# Patient Record
Sex: FEMALE | Race: WHITE | Employment: FULL TIME | URBAN - METROPOLITAN AREA
[De-identification: names, ages, dates, MRNs, and addresses within clinical notes are randomized per-mention and may not be internally consistent; named-entity substitution may affect disease eponyms.]

---

## 2017-01-09 ENCOUNTER — TELEPHONE (OUTPATIENT)
Dept: CARDIOLOGY CLINIC | Age: 27
End: 2017-01-09

## 2017-01-09 NOTE — TELEPHONE ENCOUNTER
Contacted patient to confirm New Patient appointment for 1/9/17 but patient stated that she was able to be seen by her previous cardiologist already and no longer needed the appointment.

## 2017-06-14 ENCOUNTER — OFFICE VISIT (OUTPATIENT)
Dept: FAMILY MEDICINE CLINIC | Age: 27
End: 2017-06-14

## 2017-06-14 VITALS
OXYGEN SATURATION: 99 % | SYSTOLIC BLOOD PRESSURE: 109 MMHG | HEART RATE: 75 BPM | HEIGHT: 63 IN | WEIGHT: 124.6 LBS | DIASTOLIC BLOOD PRESSURE: 71 MMHG | TEMPERATURE: 98.2 F | RESPIRATION RATE: 18 BRPM | BODY MASS INDEX: 22.08 KG/M2

## 2017-06-14 DIAGNOSIS — E03.9 ACQUIRED HYPOTHYROIDISM: Primary | ICD-10-CM

## 2017-06-14 NOTE — PROGRESS NOTES
Kriss Cheek is a 28 y.o. female presents to office for thyroid. Pt requesitng referral to new endocrinologist.       1. Have you been to the ER, urgent care clinic or hospitalized since your last visit? no  2. Have you seen any other providers outside of [facility] since your last visit? yes  3.  Have you had a Flu shot this year? no       Health Maintenance items with a due date reviewed with patient:  Health Maintenance Due   Topic Date Due    DTaP/Tdap/Td series (1 - Tdap) 05/24/2010    PAP AKA CERVICAL CYTOLOGY  05/24/2010

## 2017-06-14 NOTE — PROGRESS NOTES
Kusum Anne is a 28 y.o.  female and presents with need for a new referral to new endo and labs for hypothyroidism   With hx of Graves disease. Chief Complaint   Patient presents with    Thyroid Problem     Subjective:    Chief Complaint   Patient presents with    Thyroid Problem     Subjective: Additional Concerns: none    Patient Active Problem List    Diagnosis Date Noted    Physical exam 07/05/2016    RUQ abdominal pain 07/05/2016    Lipoma of right thigh 07/05/2016    Anxiety     Grave's disease      Current Outpatient Prescriptions   Medication Sig Dispense Refill    levothyroxine (SYNTHROID) 88 mcg tablet Take 100 mcg by mouth Daily (before breakfast).  norethindrone-ethinyl estradiol (NECON 1/35, 28,) 1-35 mg-mcg per tablet Take  by mouth.  norethindrone-ethinyl estradiol-iron (LOESTRIN FE 1.5/30, 28,) 1.5-30 mg-mcg tablet Take 1 Tab by mouth daily.          Allergies   Allergen Reactions    Latex Other (comments)    Sulfa (Sulfonamide Antibiotics) Hives    Sulfa (Sulfonamide Antibiotics) Other (comments)     Past Medical History:   Diagnosis Date    Anxiety     Constipation     Grave's disease     S/P radioactive ablation    Graves disease     Heart palpitations     Hypothyroid     Thyroid dysfunction     UTI (urinary tract infection)      Past Surgical History:   Procedure Laterality Date    HX OTHER SURGICAL      lymp node removal     HX TONSILLECTOMY       Family History   Problem Relation Age of Onset    Coronary Artery Disease Father     Cancer Father      thyroid     Social History   Substance Use Topics    Smoking status: Never Smoker    Smokeless tobacco: Not on file    Alcohol use No     ROS     General: negative for - chills, fatigue, fever, weight change  Psych: negative for - anxiety, depression, irritability or mood swings  ENT: negative for - headaches, hearing change, nasal congestion, oral lesions, sneezing or sore throat  Heme/ Lymph: negative for - bleeding problems, bruising, pallor or swollen lymph nodes  Endo: negative for - hot flashes, polydipsia/polyuria or temperature intolerance  Resp: negative for - cough, shortness of breath or wheezing  CV: negative for - chest pain, edema or palpitations  GI: negative for - abdominal pain, change in bowel habits, constipation, diarrhea or nausea/vomiting  : negative for - dysuria, hematuria, incontinence, pelvic pain or vulvar/vaginal symptoms  MSK: negative for - joint pain, joint swelling or muscle pain  Neuro: negative for - confusion, headaches, seizures or weakness  Derm: negative for - dry skin, hair changes, rash or skin lesion changes    Objective:  Vitals:    06/14/17 1423   BP: 109/71   Pulse: 75   Resp: 18   Temp: 98.2 °F (36.8 °C)   TempSrc: Oral   SpO2: 99%   Weight: 124 lb 9.6 oz (56.5 kg)   Height: 5' 3\" (1.6 m)   PainSc:   0 - No pain   LMP: 06/01/2017     PE    Alert, well appearing, and in no distress, oriented to person, place, and time and normal appearing weight  Mental status - alert, oriented to person, place, and time, normal mood, behavior, speech, dress, motor activity, and thought processes  Neck - supple, no significant adenopathy  Chest - clear to auscultation, no wheezes, rales or rhonchi, symmetric air entry  Heart - normal rate, regular rhythm, normal S1, S2, no murmurs, rubs, clicks or gallops  Extremities - peripheral pulses normal, no pedal edema, no clubbing or cyanosis    LABS   Office Visit on 12/16/2016   Component Date Value Ref Range Status    TSH 12/16/2016 1.48  0.27 - 4.20 mcU/mL Final       TESTS  Results for orders placed or performed in visit on 12/16/16   TSH 3RD GENERATION   Result Value Ref Range    TSH 1.48 0.27 - 4.20 mcU/mL     Assessment/Plan:       Acquired hypothyroidism, hx of Grave's disease.   - TSH 3RD GENERATION; Future  - REFERRAL TO ENDOCRINOLOGY  - T4, FREE; Future  - T3, FREE;  Future    Lab review: no lab studies available for review at time of visit. We will call for results and further plan. I have discussed the diagnosis with the patient and the intended plan as seen in the above orders. The patient has received an after-visit summary and questions were answered concerning future plans. I have discussed medication side effects and warnings with the patient as well. I have reviewed the plan of care with the patient, accepted their input and they are in agreement with the treatment goals. F/U needed. Og Ronquillo MD    Additional Concerns: none    Patient Active Problem List    Diagnosis Date Noted    Physical exam 07/05/2016    RUQ abdominal pain 07/05/2016    Lipoma of right thigh 07/05/2016    Anxiety     Grave's disease      Current Outpatient Prescriptions   Medication Sig Dispense Refill    norethindrone-ethinyl estradiol (Joseph Slimmer 1/35, 28,) 1-35 mg-mcg per tablet Take  by mouth.  levothyroxine (SYNTHROID) 88 mcg tablet Take 100 mcg by mouth Daily (before breakfast).  norethindrone-ethinyl estradiol-iron (LOESTRIN FE 1.5/30, 28,) 1.5-30 mg-mcg tablet Take 1 Tab by mouth daily.          Allergies   Allergen Reactions    Latex Other (comments)    Sulfa (Sulfonamide Antibiotics) Hives    Sulfa (Sulfonamide Antibiotics) Other (comments)     Past Medical History:   Diagnosis Date    Anxiety     Constipation     Grave's disease     S/P radioactive ablation    Graves disease     Heart palpitations     Hypothyroid     Thyroid dysfunction     UTI (urinary tract infection)      Past Surgical History:   Procedure Laterality Date    HX OTHER SURGICAL      lymp node removal     HX TONSILLECTOMY       Family History   Problem Relation Age of Onset    Coronary Artery Disease Father     Cancer Father      thyroid     Social History   Substance Use Topics    Smoking status: Never Smoker    Smokeless tobacco: Not on file    Alcohol use No     ROS     General: negative for - chills,  fever, weight change  Psych: negative for - anxiety, depression, irritability or mood swings  Heme/ Lymph: negative for - bleeding problems, bruising, pallor or swollen lymph nodes  Endo: negative for - hot flashes, polydipsia/polyuria or temperature intolerance  Resp: negative for - cough, shortness of breath or wheezing  CV: negative for - chest pain, edema or palpitations  GI: negative for - abdominal pain, change in bowel habits, constipation, diarrhea or nausea/vomiting  : negative for - dysuria, hematuria, incontinence, pelvic pain or vulvar/vaginal symptoms  MSK: negative for - joint pain, joint swelling or muscle pain  Neuro: negative for - confusion, headaches, seizures or weakness  Derm: negative for - dry skin, hair changes, rash or skin lesion changes    Objective:    PE    Alert, well appearing, and in no distress, oriented to person, place, and time and overweight  Mental status - alert, oriented to person, place, and time, normal mood, behavior, speech, dress, motor activity, and thought processes  Neck - supple, no significant adenopathy  Chest - clear to auscultation, no wheezes, rales or rhonchi, symmetric air entry  Heart - normal rate, regular rhythm, normal S1, S2, no murmurs, rubs, clicks or gallops  Extremities - peripheral pulses normal, no pedal edema, no clubbing or cyanosis    LABS   Office Visit on 12/16/2016   Component Date Value Ref Range Status    TSH 12/16/2016 1.48  0.27 - 4.20 mcU/mL Final       TESTS  Results for orders placed or performed in visit on 12/16/16   TSH 3RD GENERATION   Result Value Ref Range    TSH 1.48 0.27 - 4.20 mcU/mL     Assessment/Plan:      Acquired hypothyroidism   - TSH 3RD GENERATION; Future    Lab review: orders written for new lab studies as appropriate; see orders. I have discussed the diagnosis with the patient and the intended plan as seen in the above orders.   The patient has received an after-visit summary and questions were answered concerning future plans.  I have discussed medication side effects and warnings with the patient as well. I have reviewed the plan of care with the patient, accepted their input and they are in agreement with the treatment goals. F/U as needed.      Jose Quintana MD

## 2017-06-14 NOTE — PATIENT INSTRUCTIONS
Hypothyroidism: Care Instructions  Your Care Instructions  You have hypothyroidism, which means that your body is not making enough thyroid hormone. This hormone helps your body use energy. If your thyroid level is low, you may feel tired, be constipated, have an increase in your blood pressure, or have dry skin or memory problems. You may also get cold easily, even when it is warm. Women with low thyroid levels may have heavy menstrual periods. A blood test to find your thyroid-stimulating hormone (TSH) level is used to check for hypothyroidism. A high TSH level may mean that you have low thyroid. When your body is not making enough thyroid hormone, TSH levels rise in an effort to make the body produce more. The treatment for hypothyroidism is to take thyroid hormone pills. You should start to feel better in 1 to 2 weeks. But it can take several months to see changes in the TSH level. You will need regular visits with your doctor to make sure you have the right dose of medicine. Most people need treatment for the rest of their lives. You will need to see your doctor regularly to have blood tests and to make sure you are doing well. Follow-up care is a key part of your treatment and safety. Be sure to make and go to all appointments, and call your doctor if you are having problems. Its also a good idea to know your test results and keep a list of the medicines you take. How can you care for yourself at home? · Take your thyroid hormone medicine exactly as prescribed. Call your doctor if you think you are having a problem with your medicine. Most people do not have side effects if they take the right amount of medicine regularly. ¨ Take the medicine 30 minutes before breakfast, and do not take it with calcium, vitamins, or iron. ¨ Do not take extra doses of your thyroid medicine. It will not help you get better any faster, and it may cause side effects.   ¨ If you forget to take a dose, do NOT take a double dose of medicine. Take your usual dose the next day. · Tell your doctor about all prescription, herbal, or over-the-counter products you take. · Take care of yourself. Eat a healthy diet, get enough sleep, and get regular exercise. When should you call for help? Call 911 anytime you think you may need emergency care. For example, call if:  · You passed out (lost consciousness). · You have severe trouble breathing. · You have a very slow heartbeat (less than 60 beats a minute). · You have a low body temperature (95°F or below). Call your doctor now or seek immediate medical care if:  · You feel tired, sluggish, or weak. · You have trouble remembering things or concentrating. · You do not begin to feel better 2 weeks after starting your medicine. Watch closely for changes in your health, and be sure to contact your doctor if you have any problems. Where can you learn more? Go to http://jaime-arun.info/. Enter P615 in the search box to learn more about \"Hypothyroidism: Care Instructions. \"  Current as of: July 28, 2016  Content Version: 11.2  © 5333-2891 [employer]. Care instructions adapted under license by [employer] (which disclaims liability or warranty for this information). If you have questions about a medical condition or this instruction, always ask your healthcare professional. [address] any warranty or liability for your use of this information.

## 2017-06-14 NOTE — LETTER
6/19/2017 12:46 PM 
 
Ms. Maria Elena Martini 
[address] [facility] [address] [address] Dear Isabel Going: 
 
Please find your most recent results below. Resulted Orders TSH 3RD GENERATION Result Value Ref Range TSH 1.60 0.27 - 4.20 mcU/mL Narrative Unless additionally indicated, test performed at: [facility],  [facility], [facility]. PH: [phone]. T4, FREE Result Value Ref Range T4, Free 1.5 0.9 - 1.8 ng/dL Narrative Unless additionally indicated, test performed at: [facility],  [address] [facility] [address], [facility]. PH: [phone]. T3, FREE Result Value Ref Range Triiodothyronine (T3), free 2.8 2.3 - 4.2 pg/mL Narrative Unless additionally indicated, test performed at: [facility],  [address], [facility]. PH: [phone]. RECOMMENDATIONS: 
Normal results, follow-up as needed. Please call me if you have any questions: [phone] Sincerely, Michael Resendez MD

## 2017-06-15 LAB
T3FREE SERPL-MCNC: 2.8 PG/ML (ref 2.3–4.2)
T4 FREE SERPL-MCNC: 1.5 NG/DL (ref 0.9–1.8)
TSH SERPL DL<=0.005 MIU/L-ACNC: 1.6 MCU/ML (ref 0.27–4.2)

## 2017-07-28 ENCOUNTER — OFFICE VISIT (OUTPATIENT)
Dept: FAMILY MEDICINE CLINIC | Age: 27
End: 2017-07-28

## 2017-07-28 VITALS
HEIGHT: 63 IN | HEART RATE: 82 BPM | DIASTOLIC BLOOD PRESSURE: 76 MMHG | RESPIRATION RATE: 16 BRPM | SYSTOLIC BLOOD PRESSURE: 98 MMHG | OXYGEN SATURATION: 98 % | TEMPERATURE: 96.8 F | WEIGHT: 120 LBS | BODY MASS INDEX: 21.26 KG/M2

## 2017-07-28 DIAGNOSIS — Z00.00 ROUTINE GENERAL MEDICAL EXAMINATION AT A HEALTH CARE FACILITY: Primary | ICD-10-CM

## 2017-07-28 PROBLEM — I45.81 LONG Q-T SYNDROME: Status: ACTIVE | Noted: 2017-07-28

## 2017-07-28 RX ORDER — LEVOTHYROXINE SODIUM 100 UG/1
CAPSULE ORAL
Refills: 0 | COMMUNITY
Start: 2017-07-07

## 2017-07-28 NOTE — PROGRESS NOTES
Sherice Zavala is a 28 y.o. female presents in office to physical and cough. Health Maintenance Due   Topic Date Due    DTaP/Tdap/Td series (1 - Tdap) 05/24/2010    PAP AKA CERVICAL CYTOLOGY  05/24/2010         1. Have you been to the ER, urgent care clinic since your last visit? Hospitalized since your last visit?no    2. Have you seen or consulted any other health care providers outside of the [address] [place] [address] since your last visit? Include any pap smears or colon screening.  no

## 2017-07-28 NOTE — PROGRESS NOTES
Subjective:   Patient is a 28y.o. year old female who presents for Physical  Here for CPE. Fasting today. She has cough for 2 weeks. A little productive. Some post-nasal drip as well. No sinus pressure. No h/o allergic rhinitis. Her daughter had a cold first.      Review of Systems   Constitutional: Negative. HENT: Positive for congestion. Negative for ear pain and sore throat. Eyes: Negative. Respiratory: Positive for cough. Negative for shortness of breath. Cardiovascular: Negative. Gastrointestinal: Positive for constipation. Genitourinary: Negative. Musculoskeletal: Negative. Skin: Negative. Neurological: Negative. Psychiatric/Behavioral: Negative. Current Outpatient Prescriptions on File Prior to Visit   Medication Sig Dispense Refill    norethindrone-ethinyl estradiol (Meng Clack 1/35, 28,) 1-35 mg-mcg per tablet Take  by mouth.  levothyroxine (SYNTHROID) 88 mcg tablet Take 100 mcg by mouth Daily (before breakfast).  norethindrone-ethinyl estradiol-iron (LOESTRIN FE 1.5/30, 28,) 1.5-30 mg-mcg tablet Take 1 Tab by mouth daily. No current facility-administered medications on file prior to visit. Reviewed PmHx, RxHx, FmHx, SocHx, AllgHx and updated and dated in the chart. Nurse notes were reviewed and are correct    Objective:     Vitals:    07/28/17 1039   BP: 98/76   Pulse: 82   Resp: 16   Temp: 96.8 °F (36 °C)   TempSrc: Oral   SpO2: 98%   Weight: 120 lb (54.4 kg)   Height: 5' 2.99\" (1.6 m)     Physical Exam   Constitutional: She appears well-developed and well-nourished. No distress. HENT:   Head: Normocephalic and atraumatic. Right Ear: External ear normal.   Left Ear: External ear normal.   Mouth/Throat: Oropharynx is clear and moist.   Nose congested   Eyes: Conjunctivae are normal. Pupils are equal, round, and reactive to light. No scleral icterus. Neck: Normal range of motion. Neck supple. No tracheal deviation present.  No thyromegaly present. Cardiovascular: Normal rate, regular rhythm, normal heart sounds and intact distal pulses. Exam reveals no gallop and no friction rub. No murmur heard. Pulmonary/Chest: Effort normal and breath sounds normal. She has no wheezes. She has no rales. Abdominal: Soft. Bowel sounds are normal. She exhibits no distension. There is no hepatosplenomegaly. There is no tenderness. Musculoskeletal: Normal range of motion. She exhibits no edema or tenderness. Lymphadenopathy:     She has no cervical adenopathy. Skin: Skin is warm and dry. No rash noted. No pallor. Psychiatric: She has a normal mood and affect. Judgment normal.   Nursing note and vitals reviewed. Assessment/ Plan:     Diagnoses and all orders for this visit:    1. Routine general medical examination at a health care facility  -     CBC WITH AUTOMATED DIFF; Future  -     METABOLIC PANEL, COMPREHENSIVE; Future  -     LIPID PANEL; Future  Not getting TSH because she just had TFTs 1 month ago. Her symptoms c/w URI. I told her to give more time and should resolve. If worsens, RTC. I have discussed the diagnosis with the patient and the intended plan as seen in the above orders. The patient verbalized understanding and agrees with the plan. Follow-up Disposition:  Return if symptoms worsen or fail to improve.     Meseret Mullins MD

## 2017-07-28 NOTE — PATIENT INSTRUCTIONS

## 2017-08-09 ENCOUNTER — TELEPHONE (OUTPATIENT)
Dept: FAMILY MEDICINE CLINIC | Age: 27
End: 2017-08-09

## 2018-06-07 ENCOUNTER — OFFICE VISIT (OUTPATIENT)
Dept: FAMILY MEDICINE CLINIC | Age: 28
End: 2018-06-07

## 2018-06-07 VITALS
HEART RATE: 76 BPM | OXYGEN SATURATION: 100 % | HEIGHT: 62 IN | TEMPERATURE: 97.4 F | BODY MASS INDEX: 22.34 KG/M2 | DIASTOLIC BLOOD PRESSURE: 72 MMHG | SYSTOLIC BLOOD PRESSURE: 116 MMHG | RESPIRATION RATE: 17 BRPM | WEIGHT: 121.4 LBS

## 2018-06-07 DIAGNOSIS — L98.9 SKIN LESIONS: Primary | ICD-10-CM

## 2018-06-07 RX ORDER — PROPRANOLOL HYDROCHLORIDE 20 MG/1
TABLET ORAL 2 TIMES DAILY
COMMUNITY

## 2018-06-07 NOTE — PATIENT INSTRUCTIONS
Skin Lesions: Care Instructions  Your Care Instructions  A skin lesion is a general term used for the different types of bumps, spots, moles or other growths that may appear on your skin. Most skin lesions are harmless, but sometimes they can be a sign of skin cancer or other health problems. Depending on what type of lesion you have, your doctor may cut out all or a small area of the skin tissue and send it to a lab to be looked at under a microscope. This is called a biopsy. A biopsy may be done to figure out what the lesion is or to make sure it is not skin cancer. Follow-up care is a key part of your treatment and safety. Be sure to make and go to all appointments, and call your doctor if you are having problems. It's also a good idea to know your test results and keep a list of the medicines you take. How can you care for yourself at home? · If your doctor told you how to care for your wound, follow your doctor's instructions. If you did not get instructions, follow this general advice:  ¨ Keep the wound bandaged and dry for the first day. ¨ After the first day, wash around the wound with clean water 2 times a day. Don't use hydrogen peroxide or alcohol, which can slow healing. ¨ You may cover the wound with a thin layer of petroleum jelly, such as Vaseline, and a nonstick bandage. ¨ Apply more petroleum jelly and replace the bandage as needed. · If you have stitches, you may get other instructions. You will have to return to have the stitches removed. · If a scab forms, do not pull it off. Let it fall off on its own. Wounds heal faster if no scab forms. Washing the area every day and using petroleum jelly will help keep a scab from forming. · If the wound bleeds, put direct pressure on it with a clean cloth until the bleeding stops. · Take an over-the-counter pain medicine, such as acetaminophen (Tylenol), ibuprofen (Advil, Motrin), or naproxen (Aleve).  Read and follow all instructions on the label.  · Do not take two or more pain medicines at the same time unless the doctor told you to. Many pain medicines have acetaminophen, which is Tylenol. Too much acetaminophen (Tylenol) can be harmful. · If you had a growth \"frozen\" off with liquid nitrogen, you may get a blister. Do not break it. Let it dry up on its own. It is common for the blister to fill with blood. You do not need to do anything about this, but if it becomes too painful, call your doctor. When should you call for help? Call your doctor now or seek immediate medical care if:  ? · You have signs of infection, such as:  ¨ Increased pain, swelling, warmth, or redness. ¨ Red streaks leading from the wound. ¨ Pus draining from the wound. ¨ A fever. ? Watch closely for changes in your health, and be sure to contact your doctor if:  ? · The wound changes, bleeds, or gets worse. ? · You do not get better after 2 weeks of home care. Where can you learn more? Go to http://jaime-arun.info/. Enter T449 in the search box to learn more about \"Skin Lesions: Care Instructions. \"  Current as of: October 13, 2016  Content Version: 11.4  © 1687-5742 [employer]. Care instructions adapted under license by [employer] (which disclaims liability or warranty for this information). If you have questions about a medical condition or this instruction, always ask your healthcare professional. [place] [address] any warranty or liability for your use of this information.

## 2018-06-07 NOTE — PROGRESS NOTES
Freddy Boone is a 33 y.o. female presents to office for bumps on legs    Medication list has been reviewed with Freddy Boone and updated as of today's date     Health Maintenance items with a due date reviewed with patient:  Health Maintenance Due   Topic Date Due    DTaP/Tdap/Td series (1 - Tdap) 05/24/2010    PAP AKA CERVICAL CYTOLOGY  05/24/2010

## 2018-06-07 NOTE — MR AVS SNAPSHOT
[address] [facility] 
 
 
 [address] [facility] [address] [facility]ly [facility] [address] 
[phone] Patient: Vikki Núñez MRN: [id number] Visit Information Date & Time Provider Department Dept. Phone Encounter #  
 6/7/2018  3:00 PM Pablito Mcknight MD [facility] [phone] [id number] Follow-up Instructions Return if symptoms worsen or fail to improve. Your Appointments 7/31/2018  9:00 AM  
PHYSICAL with Pablito Mcknight MD  
[facility] App[facility] Note: cpe  
 [address] [facility] [address] [facility] [address]  
[phone]  
  
   
 [address] [place] [id number] Upcoming Health Maintenance Date Due DTaP/Tdap/Td series (1 - Tdap) 5/24/2010 PAP AKA CERVICAL CYTOLOGY 5/24/2010 Influenza Age 5 to Adult 8/1/2018 Allergies as of 6/7/2018  Review Complete On: 7/28/2017 By: Amado Mccarthy MD  
  
 Severity Noted Reaction Type Reactions Latex  12/12/2014    Other (comments) Sulfa (Sulfonamide Antibiotics)  04/26/2012    Hives Sulfa (Sulfonamide Antibiotics)  12/12/2014    Other (comments) Current Immunizations  Never Reviewed No immunizations on file. Not reviewed this visit You Were Diagnosed With   
  
 Codes Comments Skin lesions    -  Primary ICD-10-CM: L98.9 ICD-9-CM: 709.9 Vitals BP Pulse Temp Resp Height(growth percentile) Weight(growth percentile) 116/72 76 97.4 °F (36.3 °C) (Oral) 17 5' 2\" (1.575 m) 121 lb 6.4 oz (55.1 kg) LMP SpO2 BMI OB Status Smoking Status 06/01/2018 100% 22.2 kg/m2 IUD Never Smoker Vitals History BMI and BSA Data Body Mass Index Body Surface Area  
 22.2 kg/m 2 1.55 m 2 Preferred Pharmacy Pharmacy Name Phone  [id number] [address] [place] [address] [place] [phone] Your Updated Medication List  
  
   
This list is accurate as of 6/7/18  3:14 PM.  Always use your most recent med list.  
  
  
  
  
 Sukh Cesarena FE 1.5/30 (28-DAY) 1.5 mg-30 mcg (21)/75 mg (7) Tab Generic drug:  norethindrone-ethinyl estradiol-iron Take 1 Tab by mouth daily. NECON 1/35 (28) 1-35 mg-mcg Tab Generic drug:  norethindrone-ethinyl estradiol Take  by mouth.  
  
 propranolol 20 mg tablet Commonly known as:  INDERAL Take  by mouth two (2) times a day. * SYNTHROID 88 mcg tablet Generic drug:  levothyroxine Take 100 mcg by mouth Daily (before breakfast). * TIROSINT 100 mcg Cap Generic drug:  Levothyroxine  
take 1 capsule by mouth once daily * Notice: This list has 2 medication(s) that are the same as other medications prescribed for you. Read the directions carefully, and ask your doctor or other care provider to review them with you. We Performed the Following REFERRAL TO DERMATOLOGY [REF19 Custom] Comments:  
 Multiple subcutaneous lesions B/L legs. Follow-up Instructions Return if symptoms worsen or fail to improve. Referral Information Referral ID Referred By Referred To  
  
 [id number] Minor Ronde Not Available Visits Status Start Date End Date 1 New Request 6/7/18 6/7/19 If your referral has a status of pending review or denied, additional information will be sent to support the outcome of this decision. Patient Instructions Skin Lesions: Care Instructions Your Care Instructions A skin lesion is a general term used for the different types of bumps, spots, moles or other growths that may appear on your skin. Most skin lesions are harmless, but sometimes they can be a sign of skin cancer or other health problems.  
Depending on what type of lesion you have, your doctor may cut out all or a small area of the skin tissue and send it to a lab to be looked at under a microscope. This is called a biopsy. A biopsy may be done to figure out what the lesion is or to make sure it is not skin cancer. Follow-up care is a key part of your treatment and safety. Be sure to make and go to all appointments, and call your doctor if you are having problems. It's also a good idea to know your test results and keep a list of the medicines you take. How can you care for yourself at home? · If your doctor told you how to care for your wound, follow your doctor's instructions. If you did not get instructions, follow this general advice: ¨ Keep the wound bandaged and dry for the first day. ¨ After the first day, wash around the wound with clean water 2 times a day. Don't use hydrogen peroxide or alcohol, which can slow healing. ¨ You may cover the wound with a thin layer of petroleum jelly, such as Vaseline, and a nonstick bandage. ¨ Apply more petroleum jelly and replace the bandage as needed. · If you have stitches, you may get other instructions. You will have to return to have the stitches removed. · If a scab forms, do not pull it off. Let it fall off on its own. Wounds heal faster if no scab forms. Washing the area every day and using petroleum jelly will help keep a scab from forming. · If the wound bleeds, put direct pressure on it with a clean cloth until the bleeding stops. · Take an over-the-counter pain medicine, such as acetaminophen (Tylenol), ibuprofen (Advil, Motrin), or naproxen (Aleve). Read and follow all instructions on the label. · Do not take two or more pain medicines at the same time unless the doctor told you to. Many pain medicines have acetaminophen, which is Tylenol. Too much acetaminophen (Tylenol) can be harmful. · If you had a growth \"frozen\" off with liquid nitrogen, you may get a blister. Do not break it. Let it dry up on its own.  It is common for the blister to fill with blood. You do not need to do anything about this, but if it becomes too painful, call your doctor. When should you call for help? Call your doctor now or seek immediate medical care if: 
? · You have signs of infection, such as: 
¨ Increased pain, swelling, warmth, or redness. ¨ Red streaks leading from the wound. ¨ Pus draining from the wound. ¨ A fever. ? Watch closely for changes in your health, and be sure to contact your doctor if: 
? · The wound changes, bleeds, or gets worse. ? · You do not get better after 2 weeks of home care. Where can you learn more? Go to http://jaime-arun.info/. Enter S985 in the search box to learn more about \"Skin Lesions: Care Instructions. \" Current as of: October 13, 2016 Content Version: 11.4 © 1855-8335 [employer]. Care instructions adapted under license by [employer] (which disclaims liability or warranty for this information). If you have questions about a medical condition or this instruction, always ask your healthcare professional. [address] any warranty or liability for your use of this information. Introducing [facility]! Dear Brenda Serrano: Thank you for requesting a JoopLoop account. Our records indicate that you already have an active JoopLoop account. You can access your account anytime at [email or web address] Did you know that you can access your hospital and ER discharge instructions at any time in JoopLoop? You can also review all of your test results from your hospital stay or ER visit. Additional Information If you have questions, please visit the Frequently Asked Questions section of the JoopLoop website at [email or web address] Remember, JoopLoop is NOT to be used for urgent needs. For medical emergencies, dial 911. Now available from your iPhone and Android! Please provide this summary of care documentation to your next provider. Your primary care clinician is listed as Glynn Santoyo. If you have any questions after today's visit, please call [phone].

## 2018-06-08 NOTE — PROGRESS NOTES
Hui Mancia is a 33 y.o.  female and presents with initial visit for palpable small nodules subcutaneous in thighs     Chief Complaint   Patient presents with    Other     bumps     Subjective: Additional Concerns: none     Patient Active Problem List    Diagnosis Date Noted    Long Q-T syndrome 07/28/2017    Physical exam 07/05/2016    RUQ abdominal pain 07/05/2016    Lipoma of right thigh 07/05/2016    Anxiety     Grave's disease      Current Outpatient Prescriptions   Medication Sig Dispense Refill    propranolol (INDERAL) 20 mg tablet Take  by mouth two (2) times a day.  levothyroxine (SYNTHROID) 88 mcg tablet Take 100 mcg by mouth Daily (before breakfast).  TIROSINT 100 mcg cap take 1 capsule by mouth once daily  0    norethindrone-ethinyl estradiol (NECON 1/35, 28,) 1-35 mg-mcg per tablet Take  by mouth.  norethindrone-ethinyl estradiol-iron (LOESTRIN FE 1.5/30, 28,) 1.5-30 mg-mcg tablet Take 1 Tab by mouth daily.          Allergies   Allergen Reactions    Latex Other (comments)    Sulfa (Sulfonamide Antibiotics) Hives    Sulfa (Sulfonamide Antibiotics) Other (comments)     Past Medical History:   Diagnosis Date    Anxiety     Constipation     Grave's disease     S/P radioactive ablation    Graves disease     Heart palpitations     Hypothyroid     Thyroid dysfunction     UTI (urinary tract infection)      Past Surgical History:   Procedure Laterality Date    HX OTHER SURGICAL      lymp node removal     HX TONSILLECTOMY       Family History   Problem Relation Age of Onset    Coronary Artery Disease Father     Cancer Father      thyroid     Social History   Substance Use Topics    Smoking status: Never Smoker    Smokeless tobacco: Never Used    Alcohol use No     ROS     General: negative for - chills, fatigue, fever, weight change  Psych: negative for - anxiety, depression, irritability or mood swings  ENT: negative for - headaches, hearing change, nasal congestion, oral lesions, sneezing or sore throat  Heme/ Lymph: negative for - bleeding problems, bruising, pallor or swollen lymph nodes  Endo: negative for - hot flashes, polydipsia/polyuria or temperature intolerance  Resp: negative for - cough, shortness of breath or wheezing  CV: negative for - chest pain, edema or palpitations  GI: negative for - abdominal pain, change in bowel habits, constipation, diarrhea or nausea/vomiting  : negative for - dysuria, hematuria, incontinence, pelvic pain or vulvar/vaginal symptoms  MSK: negative for - joint pain, joint swelling or muscle pain  Neuro: negative for - confusion, headaches, seizures or weakness  Derm: negative for - dry skin, hair changes, rash or skin lesion changes    Objective:  Vitals:    06/07/18 1453   BP: 116/72   Pulse: 76   Resp: 17   Temp: 97.4 °F (36.3 °C)   TempSrc: Oral   SpO2: 100%   Weight: 121 lb 6.4 oz (55.1 kg)   Height: 5' 2\" (1.575 m)   PainSc:   0 - No pain   LMP: 06/01/2018     PE    Alert, well appearing, and in no distress, oriented to person, place, and time and normal appearing weight  General appearance - alert, well appearing, and in no distress, oriented to person, place, and time and normal appearing weight  Mental status - alert, oriented to person, place, and time, normal mood, behavior, speech, dress, motor activity, and thought processes  Extremities - peripheral pulses normal, no pedal edema, no clubbing or cyanosis  Skin - normal coloration and turgor, no rashes, no suspicious skin lesions noted    LABS   No visits with results within 6 Month(s) from this visit.   Latest known visit with results is:    Office Visit on 06/14/2017   Component Date Value Ref Range Status    TSH 06/14/2017 1.60  0.27 - 4.20 mcU/mL Final    T4, Free 06/14/2017 1.5  0.9 - 1.8 ng/dL Final    Triiodothyronine (T3), free 06/14/2017 2.8  2.3 - 4.2 pg/mL Final       TESTS  Results for orders placed or performed in visit on 06/14/17   [address] GENERATION   Result Value Ref Range    TSH 1.60 0.27 - 4.20 mcU/mL   T4, FREE   Result Value Ref Range    T4, Free 1.5 0.9 - 1.8 ng/dL   T3, FREE   Result Value Ref Range    Triiodothyronine (T3), free 2.8 2.3 - 4.2 pg/mL     Assessment/Plan:      Skin lesions multiple in both thighs and legs appears to be multiplying. No personal or family hx of skin malignancy  - REFERRAL TO DERMATOLOGY    Lab review: orders written for new lab studies as appropriate; see orders. I have discussed the diagnosis with the patient and the intended plan as seen in the above orders. The patient has received an after-visit summary and questions were answered concerning future plans. I have discussed medication side effects and warnings with the patient as well. I have reviewed the plan of care with the patient, accepted their input and they are in agreement with the treatment goals. F/U as needed.      Cherelle Romeo MD

## 2018-06-13 ENCOUNTER — TELEPHONE (OUTPATIENT)
Dept: FAMILY MEDICINE CLINIC | Age: 28
End: 2018-06-13

## 2018-06-13 DIAGNOSIS — L98.9 SKIN LESION OF RIGHT LOWER EXTREMITY: ICD-10-CM

## 2018-06-13 DIAGNOSIS — L98.9 SKIN LESION OF LEFT LOWER EXTREMITY: Primary | ICD-10-CM

## 2018-06-13 NOTE — TELEPHONE ENCOUNTER
Pt called to f/u on her referral to [facility]. She was seen by there office but states that they were unable to treat the bumps in her legs because of how deep they are and are recommending that she gets an ultrasound of the spots.      Sending request for office notes

## 2018-06-13 NOTE — TELEPHONE ENCOUNTER
Please see pended orders. Patient requesting to go to [facility]. Please let me know once signed and I will fax.  Thank you

## 2018-08-02 ENCOUNTER — OFFICE VISIT (OUTPATIENT)
Dept: FAMILY MEDICINE CLINIC | Age: 28
End: 2018-08-02

## 2018-08-02 VITALS
OXYGEN SATURATION: 98 % | HEIGHT: 62 IN | HEART RATE: 64 BPM | BODY MASS INDEX: 22.63 KG/M2 | WEIGHT: 123 LBS | TEMPERATURE: 96.4 F | DIASTOLIC BLOOD PRESSURE: 66 MMHG | SYSTOLIC BLOOD PRESSURE: 100 MMHG | RESPIRATION RATE: 18 BRPM

## 2018-08-02 DIAGNOSIS — Z00.00 PHYSICAL EXAM: Primary | ICD-10-CM

## 2018-08-02 NOTE — PATIENT INSTRUCTIONS

## 2018-08-02 NOTE — PROGRESS NOTES
Anatoliy Sena is a 33 y.o. female presents to office for physical    Medication list has been reviewed with Anatoliy Sena and updated as of today's date     Health Maintenance items with a due date reviewed with patient:  Health Maintenance Due   Topic Date Due    DTaP/Tdap/Td series (1 - Tdap) 05/24/2010    PAP AKA CERVICAL CYTOLOGY  05/24/2010    Influenza Age 9 to Adult  08/01/2018

## 2018-08-03 LAB
A-G RATIO,AGRAT: 1.8 RATIO (ref 1.1–2.6)
ABSOLUTE LYMPHOCYTE COUNT, 10803: 1.7 K/UL (ref 1–4.8)
ALBUMIN SERPL-MCNC: 4.4 G/DL (ref 3.5–5)
ALP SERPL-CCNC: 75 U/L (ref 25–115)
ALT SERPL-CCNC: 9 U/L (ref 5–40)
ANION GAP SERPL CALC-SCNC: 15 MMOL/L
AST SERPL W P-5'-P-CCNC: 12 U/L (ref 10–37)
AVG GLU, 10930: 92 MG/DL (ref 91–123)
BASOPHILS # BLD: 0 K/UL (ref 0–0.2)
BASOPHILS NFR BLD: 1 % (ref 0–2)
BILIRUB SERPL-MCNC: 0.4 MG/DL (ref 0.2–1.2)
BUN SERPL-MCNC: 11 MG/DL (ref 6–22)
CALCIUM SERPL-MCNC: 9.1 MG/DL (ref 8.4–10.5)
CHLORIDE SERPL-SCNC: 101 MMOL/L (ref 98–110)
CHOLEST SERPL-MCNC: 158 MG/DL (ref 110–200)
CO2 SERPL-SCNC: 25 MMOL/L (ref 20–32)
CREAT SERPL-MCNC: 0.8 MG/DL (ref 0.5–1.2)
EOSINOPHIL # BLD: 0.4 K/UL (ref 0–0.5)
EOSINOPHIL NFR BLD: 8 % (ref 0–6)
ERYTHROCYTE [DISTWIDTH] IN BLOOD BY AUTOMATED COUNT: 13.2 % (ref 10–15.5)
GFRAA, 66117: >60
GFRNA, 66118: >60
GLOBULIN,GLOB: 2.5 G/DL (ref 2–4)
GLUCOSE SERPL-MCNC: 84 MG/DL (ref 70–99)
GRANULOCYTES,GRANS: 49 % (ref 40–75)
HBA1C MFR BLD HPLC: 4.9 % (ref 4.8–5.9)
HCT VFR BLD AUTO: 41.9 % (ref 35.1–46.5)
HDLC SERPL-MCNC: 3.5 MG/DL (ref 0–5)
HDLC SERPL-MCNC: 45 MG/DL (ref 40–59)
HGB BLD-MCNC: 13.6 G/DL (ref 11.7–15.5)
LDLC SERPL CALC-MCNC: 98 MG/DL (ref 50–99)
LYMPHOCYTES, LYMLT: 35 % (ref 20–45)
MCH RBC QN AUTO: 32 PG (ref 26–34)
MCHC RBC AUTO-ENTMCNC: 33 G/DL (ref 31–36)
MCV RBC AUTO: 98 FL (ref 80–95)
MONOCYTES # BLD: 0.4 K/UL (ref 0.1–1)
MONOCYTES NFR BLD: 8 % (ref 3–12)
NEUTROPHILS # BLD AUTO: 2.4 K/UL (ref 1.8–7.7)
PLATELET # BLD AUTO: 237 K/UL (ref 140–440)
PMV BLD AUTO: 10.4 FL (ref 9–13)
POTASSIUM SERPL-SCNC: 4.2 MMOL/L (ref 3.5–5.5)
PROT SERPL-MCNC: 6.9 G/DL (ref 6.4–8.3)
RBC # BLD AUTO: 4.29 M/UL (ref 3.8–5.2)
SODIUM SERPL-SCNC: 141 MMOL/L (ref 133–145)
TRIGL SERPL-MCNC: 77 MG/DL (ref 40–149)
TSH SERPL DL<=0.005 MIU/L-ACNC: 2.65 MCU/ML (ref 0.27–4.2)
VLDLC SERPL CALC-MCNC: 15 MG/DL (ref 8–30)
WBC # BLD AUTO: 4.9 K/UL (ref 4–11)

## 2018-08-03 NOTE — PROGRESS NOTES
Benedict Gross is a 33 y.o.  female and presents with screening physical. Patient denies any complaints. Chief Complaint   Patient presents with    Physical     Subjective: Additional Concerns: none    Patient Active Problem List    Diagnosis Date Noted    Long Q-T syndrome 07/28/2017    Physical exam 07/05/2016    RUQ abdominal pain 07/05/2016    Lipoma of right thigh 07/05/2016    Anxiety     Grave's disease      Current Outpatient Prescriptions   Medication Sig Dispense Refill    propranolol (INDERAL) 20 mg tablet Take  by mouth two (2) times a day.  TIROSINT 100 mcg cap take 1 capsule by mouth once daily  0    norethindrone-ethinyl estradiol (NECON 1/35, 28,) 1-35 mg-mcg per tablet Take  by mouth.  levothyroxine (SYNTHROID) 88 mcg tablet Take 100 mcg by mouth Daily (before breakfast).  norethindrone-ethinyl estradiol-iron (LOESTRIN FE 1.5/30, 28,) 1.5-30 mg-mcg tablet Take 1 Tab by mouth daily.          Allergies   Allergen Reactions    Latex Other (comments)    Sulfa (Sulfonamide Antibiotics) Hives    Sulfa (Sulfonamide Antibiotics) Other (comments)     Past Medical History:   Diagnosis Date    Anxiety     Constipation     Grave's disease     S/P radioactive ablation    Graves disease     Heart palpitations     Hypothyroid     Thyroid dysfunction     UTI (urinary tract infection)      Past Surgical History:   Procedure Laterality Date    HX OTHER SURGICAL      lymp node removal     HX TONSILLECTOMY       Family History   Problem Relation Age of Onset    Coronary Artery Disease Father     Cancer Father      thyroid     Social History   Substance Use Topics    Smoking status: Never Smoker    Smokeless tobacco: Never Used    Alcohol use No     ROS     General: negative for - chills, fatigue, fever, weight change  Psych: negative for - anxiety, depression, irritability or mood swings  ENT: negative for - headaches, hearing change, nasal congestion, oral lesions, sneezing or sore throat  Heme/ Lymph: negative for - bleeding problems, bruising, pallor or swollen lymph nodes  Endo: negative for - hot flashes, polydipsia/polyuria or temperature intolerance  Resp: negative for - cough, shortness of breath or wheezing  CV: negative for - chest pain, edema or palpitations  GI: negative for - abdominal pain, change in bowel habits, constipation, diarrhea or nausea/vomiting  : negative for - dysuria, hematuria, incontinence, pelvic pain or vulvar/vaginal symptoms  MSK: negative for - joint pain, joint swelling or muscle pain  Neuro: negative for - confusion, headaches, seizures or weakness  Derm: negative for - dry skin, hair changes, rash or skin lesion changes    Objective:  Vitals:    08/02/18 0848   BP: 100/66   Pulse: 64   Resp: 18   Temp: 96.4 °F (35.8 °C)   TempSrc: Oral   SpO2: 98%   Weight: 123 lb (55.8 kg)   Height: 5' 2\" (1.575 m)   PainSc:   0 - No pain     PE    Alert, well appearing, and in no distress, oriented to person, place, and time and normal appearing weight  General appearance - alert, well appearing, and in no distress, oriented to person, place, and time and normal appearing weight  Mental status - alert, oriented to person, place, and time, normal mood, behavior, speech, dress, motor activity, and thought processes  Eyes - pupils equal and reactive, extraocular eye movements intact  Ears - bilateral TM's and external ear canals normal  Mouth - mucous membranes moist, pharynx normal without lesions  Neck - supple, no significant adenopathy  Chest - clear to auscultation, no wheezes, rales or rhonchi, symmetric air entry  Heart - normal rate, regular rhythm, normal S1, S2, no murmurs, rubs, clicks or gallops  Abdomen - soft, nontender, nondistended, no masses or organomegaly  Back exam - full range of motion, no tenderness, palpable spasm or pain on motion    LABS   Office Visit on 08/02/2018   Component Date Value Ref Range Status    Triglyceride 08/02/2018 77  40 - 149 mg/dL Final    HDL Cholesterol 08/02/2018 45  40 - 59 mg/dL Final    Cholesterol, total 08/02/2018 158  110 - 200 mg/dL Final    CHOLESTEROL/HDL 08/02/2018 3.5  0.0 - 5.0 Final    LDL, calculated 08/02/2018 98  50 - 99 mg/dL Final    VLDL, calculated 08/02/2018 15  8 - 30 mg/dL Final    Comment: Test includes cholesterol, HDL cholesterol, triglycerides and LDL. Cholesterol Recommended NCEP guidelines in mg/dL:  Less than 200            Desirable  200 - 239                Borderline High  Greater than or  = 240   High  Please Note:  Total Chol/HDL Ratio                   Men     Women  1/2 Avg. Risk    3.4     3.3      Avg. Risk    5.0     4.4  2X  Avg. Risk    9.6     7.1  3X  Avg. Risk   23.4    11.0      WBC 08/02/2018 4.9  4.0 - 11.0 K/uL Final    RBC 08/02/2018 4.29  3.80 - 5.20 M/uL Final    HGB 08/02/2018 13.6  11.7 - 15.5 g/dL Final    HCT 08/02/2018 41.9  35.1 - 46.5 % Final    MCV 08/02/2018 98* 80 - 95 fL Final    MCH 08/02/2018 32  26 - 34 pg Final    MCHC 08/02/2018 33  31 - 36 g/dL Final    RDW 08/02/2018 13.2  10.0 - 15.5 % Final    PLATELET 34/30/9078 015  140 - 440 K/uL Final    MPV 08/02/2018 10.4  9.0 - 13.0 fL Final    NEUTROPHILS 08/02/2018 49  40 - 75 % Final    Lymphocytes 08/02/2018 35  20 - 45 % Final    MONOCYTES 08/02/2018 8  3 - 12 % Final    EOSINOPHILS 08/02/2018 8* 0 - 6 % Final    BASOPHILS 08/02/2018 1  0 - 2 % Final    ABS. NEUTROPHILS 08/02/2018 2.4  1.8 - 7.7 K/uL Final    ABSOLUTE LYMPHOCYTE COUNT 08/02/2018 1.7  1.0 - 4.8 K/uL Final    ABS. MONOCYTES 08/02/2018 0.4  0.1 - 1.0 K/uL Final    ABS. EOSINOPHILS 08/02/2018 0.4  0.0 - 0.5 K/uL Final    ABS.  BASOPHILS 08/02/2018 0.0  0.0 - 0.2 K/uL Final    Glucose 08/02/2018 84  70 - 99 mg/dL Final    BUN 08/02/2018 11  6 - 22 mg/dL Final    Creatinine 08/02/2018 0.8  0.5 - 1.2 mg/dL Final    Sodium 08/02/2018 141  133 - 145 mmol/L Final    Potassium 08/02/2018 4.2  3.5 - 5.5 mmol/L Final    Chloride 08/02/2018 101  98 - 110 mmol/L Final    CO2 08/02/2018 25  20 - 32 mmol/L Final    AST (SGOT) 08/02/2018 12  10 - 37 U/L Final    ALT (SGPT) 08/02/2018 9  5 - 40 U/L Final    Alk. phosphatase 08/02/2018 75  25 - 115 U/L Final    Bilirubin, total 08/02/2018 0.4  0.2 - 1.2 mg/dL Final    Calcium 08/02/2018 9.1  8.4 - 10.5 mg/dL Final    Protein, total 08/02/2018 6.9  6.4 - 8.3 g/dL Final    Albumin 08/02/2018 4.4  3.5 - 5.0 g/dL Final    A-G Ratio 08/02/2018 1.8  1.1 - 2.6 ratio Final    Globulin 08/02/2018 2.5  2.0 - 4.0 g/dL Final    Anion gap 08/02/2018 15.0  mmol/L Final    Comment: Test includes Albumin, Alkaline Phosphatase, ALT, AST, BUN, Calcium, CO2,  Chloride, Creatinine, Glucose, Potassium, Sodium, Total Bilirubin and Total  Protein. Estimated GFR results are reported in mL/min/1.73 sq.m. by the MDRD equation. This eGFR is validated for stable chronic renal failure patients. This   equation  is unreliable in acute illness or patients with normal renal function.       GFRAA 08/02/2018 >60.0  >60.0 Final    GFRNA 08/02/2018 >60.0  >60.0 Final    TSH 08/02/2018 2.65  0.27 - 4.20 mcU/mL Final    Hemoglobin A1c 08/02/2018 4.9  4.8 - 5.9 % Final    AVG GLU 08/02/2018 92  91 - 123 mg/dL Final       TESTS  Results for orders placed or performed in visit on 08/02/18   LIPID PANEL   Result Value Ref Range    Triglyceride 77 40 - 149 mg/dL    HDL Cholesterol 45 40 - 59 mg/dL    Cholesterol, total 158 110 - 200 mg/dL    CHOLESTEROL/HDL 3.5 0.0 - 5.0    LDL, calculated 98 50 - 99 mg/dL    VLDL, calculated 15 8 - 30 mg/dL   CBC WITH AUTOMATED DIFF   Result Value Ref Range    WBC 4.9 4.0 - 11.0 K/uL    RBC 4.29 3.80 - 5.20 M/uL    HGB 13.6 11.7 - 15.5 g/dL    HCT 41.9 35.1 - 46.5 %    MCV 98 (H) 80 - 95 fL    MCH 32 26 - 34 pg    MCHC 33 31 - 36 g/dL    RDW 13.2 10.0 - 15.5 %    PLATELET 880 908 - 792 K/uL    MPV 10.4 9.0 - 13.0 fL    NEUTROPHILS 49 40 - 75 %    Lymphocytes 35 20 - 45 %    MONOCYTES 8 3 - 12 %    EOSINOPHILS 8 (H) 0 - 6 %    BASOPHILS 1 0 - 2 %    ABS. NEUTROPHILS 2.4 1.8 - 7.7 K/uL    ABSOLUTE LYMPHOCYTE COUNT 1.7 1.0 - 4.8 K/uL    ABS. MONOCYTES 0.4 0.1 - 1.0 K/uL    ABS. EOSINOPHILS 0.4 0.0 - 0.5 K/uL    ABS. BASOPHILS 0.0 0.0 - 0.2 K/uL   METABOLIC PANEL, COMPREHENSIVE   Result Value Ref Range    Glucose 84 70 - 99 mg/dL    BUN 11 6 - 22 mg/dL    Creatinine 0.8 0.5 - 1.2 mg/dL    Sodium 141 133 - 145 mmol/L    Potassium 4.2 3.5 - 5.5 mmol/L    Chloride 101 98 - 110 mmol/L    CO2 25 20 - 32 mmol/L    AST (SGOT) 12 10 - 37 U/L    ALT (SGPT) 9 5 - 40 U/L    Alk. phosphatase 75 25 - 115 U/L    Bilirubin, total 0.4 0.2 - 1.2 mg/dL    Calcium 9.1 8.4 - 10.5 mg/dL    Protein, total 6.9 6.4 - 8.3 g/dL    Albumin 4.4 3.5 - 5.0 g/dL    A-G Ratio 1.8 1.1 - 2.6 ratio    Globulin 2.5 2.0 - 4.0 g/dL    Anion gap 15.0 mmol/L    GFRAA >60.0 >60.0    GFRNA >60.0 >60.0   TSH 3RD GENERATION   Result Value Ref Range    TSH 2.65 0.27 - 4.20 mcU/mL   HEMOGLOBIN A1C W/O EAG   Result Value Ref Range    Hemoglobin A1c 4.9 4.8 - 5.9 %    AVG GLU 92 91 - 123 mg/dL     Assessment/Plan:      1. Physical exam    - LIPID PANEL; Future  - CBC WITH AUTOMATED DIFF; Future  - METABOLIC PANEL, COMPREHENSIVE; Future  - TSH 3RD GENERATION; Future  - HEMOGLOBIN A1C WITH EAG; Future    Lab review: orders written for new lab studies as appropriate; see orders. We will call for results and further plan. I have discussed the diagnosis with the patient and the intended plan as seen in the above orders. The patient has received an after-visit summary and questions were answered concerning future plans. I have discussed medication side effects and warnings with the patient as well. I have reviewed the plan of care with the patient, accepted their input and they are in agreement with the treatment goals.      Follow-up Disposition:  Return in about 1 year (around 8/2/2019), or if symptoms worsen or fail to improve.     Rosenda Pacheco MD

## 2018-12-18 ENCOUNTER — OFFICE VISIT (OUTPATIENT)
Dept: FAMILY MEDICINE CLINIC | Age: 28
End: 2018-12-18

## 2018-12-18 VITALS
HEIGHT: 62 IN | OXYGEN SATURATION: 98 % | HEART RATE: 66 BPM | WEIGHT: 127 LBS | DIASTOLIC BLOOD PRESSURE: 73 MMHG | BODY MASS INDEX: 23.37 KG/M2 | SYSTOLIC BLOOD PRESSURE: 106 MMHG | TEMPERATURE: 98.4 F | RESPIRATION RATE: 18 BRPM

## 2018-12-18 DIAGNOSIS — D17.9 LIPOMA, UNSPECIFIED SITE: Primary | ICD-10-CM

## 2018-12-18 NOTE — PROGRESS NOTES
Lorenzo Frederick is a 33 y.o. female presents to office for bump on leg     Medication list has been reviewed with [facility] and updated as of today's date     Health Maintenance items with a due date reviewed with patient:  Health Maintenance Due   Topic Date Due    PAP AKA CERVICAL CYTOLOGY  05/24/2010

## 2018-12-20 NOTE — PROGRESS NOTES
Ambrocio Loving     Chief Complaint   Patient presents with    Mass     Vitals:    12/18/18 1559   BP: 106/73   Pulse: 66   Resp: 18   Temp: 98.4 °F (36.9 °C)   TempSrc: Oral   SpO2: 98%   Weight: 127 lb (57.6 kg)   Height: 5' 2\" (1.575 m)   PainSc:   0 - No pain         HPI: Patient is here to discuss the multiple swelling that she have previously was diagnosed with lipomas by ultrasound, they are nontender no increase in size no numbness no pain with movement. Past Medical History:   Diagnosis Date    Anxiety     Constipation     Grave's disease     S/P radioactive ablation    Graves disease     Heart palpitations     Hypothyroid     Thyroid dysfunction     UTI (urinary tract infection)      Past Surgical History:   Procedure Laterality Date    HX OTHER SURGICAL      lymp node removal     HX TONSILLECTOMY       Social History     Tobacco Use    Smoking status: Never Smoker    Smokeless tobacco: Never Used   Substance Use Topics    Alcohol use: No       Family History   Problem Relation Age of Onset    Coronary Artery Disease Father     Cancer Father         thyroid       Review of Systems   Constitutional: Negative for chills, fever, malaise/fatigue and weight loss. HENT: Negative for congestion, ear discharge, ear pain, hearing loss, nosebleeds and sinus pain. Eyes: Negative for blurred vision, double vision and discharge. Respiratory: Negative for cough. Cardiovascular: Negative for chest pain, palpitations, claudication and leg swelling. Gastrointestinal: Negative for abdominal pain, constipation, diarrhea, nausea and vomiting. Genitourinary: Negative for dysuria, frequency and urgency. Musculoskeletal: Negative for myalgias. Skin: Negative for itching and rash. Neurological: Negative for dizziness, tingling, sensory change, speech change, focal weakness, weakness and headaches. Psychiatric/Behavioral: Negative for depression and suicidal ideas.        Physical Exam Constitutional: She is oriented to person, place, and time. She appears well-developed and well-nourished. No distress. HENT:   Head: Normocephalic and atraumatic. Mouth/Throat: Oropharynx is clear and moist. No oropharyngeal exudate. Eyes: Conjunctivae are normal. Pupils are equal, round, and reactive to light. Right eye exhibits no discharge. Left eye exhibits no discharge. No scleral icterus. Neck: Normal range of motion. Neck supple. No thyromegaly present. Cardiovascular: Normal rate, regular rhythm and normal heart sounds. No murmur heard. Pulmonary/Chest: Effort normal and breath sounds normal. No respiratory distress. She has no wheezes. She has no rales. She exhibits no tenderness. Abdominal: Soft. She exhibits no distension. There is no tenderness. There is no rebound. Musculoskeletal: Normal range of motion. She exhibits no edema, tenderness or deformity. There are a few subcutaneous lipomas and both lower extremities above 2-3 cm in diameter than nontender   Lymphadenopathy:     She has no cervical adenopathy. Neurological: She is alert and oriented to person, place, and time. Skin: Skin is warm and dry. No rash noted. She is not diaphoretic. No erythema. No pallor. Psychiatric: She has a normal mood and affect. Her behavior is normal. Judgment and thought content normal.   Nursing note reviewed. Assessment and plan     Plan of care has been discussed with the patient, he agrees to the plan and verbalized understanding. All his questions were answered  More than 50% of the time spent in this visit was counseling the patient about  illness and treatment options         1. Lipoma, unspecified site  Lipomas documented by previous ultrasound, patient has been reassured, if 1 of the lipomas become painful or enlarged, or pressing on nerve or ligament can be removed. Otherwise if they are not symptomatic for now will just be observed.     Current Outpatient Medications Medication Sig Dispense Refill    propranolol (INDERAL) 20 mg tablet Take  by mouth two (2) times a day.  TIROSINT 100 mcg cap take 1 capsule by mouth once daily  0    norethindrone-ethinyl estradiol (NECON 1/35, 28,) 1-35 mg-mcg per tablet Take  by mouth.  levothyroxine (SYNTHROID) 88 mcg tablet Take 100 mcg by mouth Daily (before breakfast).  norethindrone-ethinyl estradiol-iron (LOESTRIN FE 1.5/30, 28,) 1.5-30 mg-mcg tablet Take 1 Tab by mouth daily. Patient Active Problem List    Diagnosis Date Noted    Long Q-T syndrome 07/28/2017    Physical exam 07/05/2016    RUQ abdominal pain 07/05/2016    Lipoma of right thigh 07/05/2016    Anxiety     Grave's disease      Results for orders placed or performed in visit on 08/02/18   LIPID PANEL   Result Value Ref Range    Triglyceride 77 40 - 149 mg/dL    HDL Cholesterol 45 40 - 59 mg/dL    Cholesterol, total 158 110 - 200 mg/dL    CHOLESTEROL/HDL 3.5 0.0 - 5.0    LDL, calculated 98 50 - 99 mg/dL    VLDL, calculated 15 8 - 30 mg/dL   CBC WITH AUTOMATED DIFF   Result Value Ref Range    WBC 4.9 4.0 - 11.0 K/uL    RBC 4.29 3.80 - 5.20 M/uL    HGB 13.6 11.7 - 15.5 g/dL    HCT 41.9 35.1 - 46.5 %    MCV 98 (H) 80 - 95 fL    MCH 32 26 - 34 pg    MCHC 33 31 - 36 g/dL    RDW 13.2 10.0 - 15.5 %    PLATELET 732 441 - 862 K/uL    MPV 10.4 9.0 - 13.0 fL    NEUTROPHILS 49 40 - 75 %    Lymphocytes 35 20 - 45 %    MONOCYTES 8 3 - 12 %    EOSINOPHILS 8 (H) 0 - 6 %    BASOPHILS 1 0 - 2 %    ABS. NEUTROPHILS 2.4 1.8 - 7.7 K/uL    ABSOLUTE LYMPHOCYTE COUNT 1.7 1.0 - 4.8 K/uL    ABS. MONOCYTES 0.4 0.1 - 1.0 K/uL    ABS. EOSINOPHILS 0.4 0.0 - 0.5 K/uL    ABS.  BASOPHILS 0.0 0.0 - 0.2 K/uL   METABOLIC PANEL, COMPREHENSIVE   Result Value Ref Range    Glucose 84 70 - 99 mg/dL    BUN 11 6 - 22 mg/dL    Creatinine 0.8 0.5 - 1.2 mg/dL    Sodium 141 133 - 145 mmol/L    Potassium 4.2 3.5 - 5.5 mmol/L    Chloride 101 98 - 110 mmol/L    CO2 25 20 - 32 mmol/L    AST (SGOT) 12 10 - 37 U/L    ALT (SGPT) 9 5 - 40 U/L    Alk. phosphatase 75 25 - 115 U/L    Bilirubin, total 0.4 0.2 - 1.2 mg/dL    Calcium 9.1 8.4 - 10.5 mg/dL    Protein, total 6.9 6.4 - 8.3 g/dL    Albumin 4.4 3.5 - 5.0 g/dL    A-G Ratio 1.8 1.1 - 2.6 ratio    Globulin 2.5 2.0 - 4.0 g/dL    Anion gap 15.0 mmol/L    GFRAA >60.0 >60.0    GFRNA >60.0 >60.0   TSH 3RD GENERATION   Result Value Ref Range    TSH 2.65 0.27 - 4.20 mcU/mL   HEMOGLOBIN A1C W/O EAG   Result Value Ref Range    Hemoglobin A1c 4.9 4.8 - 5.9 %    AVG GLU 92 91 - 123 mg/dL     No visits with results within 3 Month(s) from this visit. Latest known visit with results is:   Office Visit on 08/02/2018   Component Date Value Ref Range Status    Triglyceride 08/02/2018 77  40 - 149 mg/dL Final    HDL Cholesterol 08/02/2018 45  40 - 59 mg/dL Final    Cholesterol, total 08/02/2018 158  110 - 200 mg/dL Final    CHOLESTEROL/HDL 08/02/2018 3.5  0.0 - 5.0 Final    LDL, calculated 08/02/2018 98  50 - 99 mg/dL Final    VLDL, calculated 08/02/2018 15  8 - 30 mg/dL Final    Comment: Test includes cholesterol, HDL cholesterol, triglycerides and LDL. Cholesterol Recommended NCEP guidelines in mg/dL:  Less than 200            Desirable  200 - 239                Borderline High  Greater than or  = 240   High  Please Note:  Total Chol/HDL Ratio                   Men     Women  1/2 Avg. Risk    3.4     3.3      Avg. Risk    5.0     4.4  2X  Avg. Risk    9.6     7.1  3X  Avg.  Risk   23.4    11.0      WBC 08/02/2018 4.9  4.0 - 11.0 K/uL Final    RBC 08/02/2018 4.29  3.80 - 5.20 M/uL Final    HGB 08/02/2018 13.6  11.7 - 15.5 g/dL Final    HCT 08/02/2018 41.9  35.1 - 46.5 % Final    MCV 08/02/2018 98* 80 - 95 fL Final    MCH 08/02/2018 32  26 - 34 pg Final    MCHC 08/02/2018 33  31 - 36 g/dL Final    RDW 08/02/2018 13.2  10.0 - 15.5 % Final    PLATELET 02/95/0112 003  140 - 440 K/uL Final    MPV 08/02/2018 10.4  9.0 - 13.0 fL Final    NEUTROPHILS 08/02/2018 49  40 - 75 % Final    Lymphocytes 08/02/2018 35  20 - 45 % Final    MONOCYTES 08/02/2018 8  3 - 12 % Final    EOSINOPHILS 08/02/2018 8* 0 - 6 % Final    BASOPHILS 08/02/2018 1  0 - 2 % Final    ABS. NEUTROPHILS 08/02/2018 2.4  1.8 - 7.7 K/uL Final    ABSOLUTE LYMPHOCYTE COUNT 08/02/2018 1.7  1.0 - 4.8 K/uL Final    ABS. MONOCYTES 08/02/2018 0.4  0.1 - 1.0 K/uL Final    ABS. EOSINOPHILS 08/02/2018 0.4  0.0 - 0.5 K/uL Final    ABS. BASOPHILS 08/02/2018 0.0  0.0 - 0.2 K/uL Final    Glucose 08/02/2018 84  70 - 99 mg/dL Final    BUN 08/02/2018 11  6 - 22 mg/dL Final    Creatinine 08/02/2018 0.8  0.5 - 1.2 mg/dL Final    Sodium 08/02/2018 141  133 - 145 mmol/L Final    Potassium 08/02/2018 4.2  3.5 - 5.5 mmol/L Final    Chloride 08/02/2018 101  98 - 110 mmol/L Final    CO2 08/02/2018 25  20 - 32 mmol/L Final    AST (SGOT) 08/02/2018 12  10 - 37 U/L Final    ALT (SGPT) 08/02/2018 9  5 - 40 U/L Final    Alk. phosphatase 08/02/2018 75  25 - 115 U/L Final    Bilirubin, total 08/02/2018 0.4  0.2 - 1.2 mg/dL Final    Calcium 08/02/2018 9.1  8.4 - 10.5 mg/dL Final    Protein, total 08/02/2018 6.9  6.4 - 8.3 g/dL Final    Albumin 08/02/2018 4.4  3.5 - 5.0 g/dL Final    A-G Ratio 08/02/2018 1.8  1.1 - 2.6 ratio Final    Globulin 08/02/2018 2.5  2.0 - 4.0 g/dL Final    Anion gap 08/02/2018 15.0  mmol/L Final    Comment: Test includes Albumin, Alkaline Phosphatase, ALT, AST, BUN, Calcium, CO2,  Chloride, Creatinine, Glucose, Potassium, Sodium, Total Bilirubin and Total  Protein. Estimated GFR results are reported in mL/min/1.73 sq.m. by the MDRD equation. This eGFR is validated for stable chronic renal failure patients. This   equation  is unreliable in acute illness or patients with normal renal function.       GFRAA 08/02/2018 >60.0  >60.0 Final    GFRNA 08/02/2018 >60.0  >60.0 Final    TSH 08/02/2018 2.65  0.27 - 4.20 mcU/mL Final    Hemoglobin A1c 08/02/2018 4.9  4.8 - 5.9 % Final    AVG GLU 08/02/2018 92  91 - 123 mg/dL Final          Follow-up Disposition: Not on File